# Patient Record
Sex: MALE | Race: WHITE | NOT HISPANIC OR LATINO | Employment: OTHER | URBAN - METROPOLITAN AREA
[De-identification: names, ages, dates, MRNs, and addresses within clinical notes are randomized per-mention and may not be internally consistent; named-entity substitution may affect disease eponyms.]

---

## 2023-10-23 ENCOUNTER — OFFICE VISIT (OUTPATIENT)
Dept: URGENT CARE | Facility: CLINIC | Age: 68
End: 2023-10-23
Payer: MEDICARE

## 2023-10-23 VITALS
RESPIRATION RATE: 20 BRPM | TEMPERATURE: 99 F | WEIGHT: 182.81 LBS | DIASTOLIC BLOOD PRESSURE: 99 MMHG | OXYGEN SATURATION: 98 % | SYSTOLIC BLOOD PRESSURE: 158 MMHG | HEIGHT: 72 IN | HEART RATE: 72 BPM | BODY MASS INDEX: 24.76 KG/M2

## 2023-10-23 DIAGNOSIS — R05.1 ACUTE COUGH: Primary | ICD-10-CM

## 2023-10-23 PROCEDURE — 99204 OFFICE O/P NEW MOD 45 MIN: CPT | Mod: S$GLB,,, | Performed by: NURSE PRACTITIONER

## 2023-10-23 PROCEDURE — 99204 PR OFFICE/OUTPT VISIT, NEW, LEVL IV, 45-59 MIN: ICD-10-PCS | Mod: S$GLB,,, | Performed by: NURSE PRACTITIONER

## 2023-10-23 RX ORDER — BENZONATATE 200 MG/1
200 CAPSULE ORAL 3 TIMES DAILY PRN
Qty: 30 CAPSULE | Refills: 0 | Status: SHIPPED | OUTPATIENT
Start: 2023-10-23 | End: 2023-11-02

## 2023-10-23 NOTE — PATIENT INSTRUCTIONS
Claritin(loratadine), Allegra(fexofenadine), or zyrtec(cetirizine) for post nasal drip triggering cough. Take daily for 2 weeks.

## 2023-10-23 NOTE — PROGRESS NOTES
"Subjective:      Patient ID: Rizwan Burgos is a 67 y.o. male.    Vitals:  height is 5' 11.5" (1.816 m) and weight is 82.9 kg (182 lb 12.8 oz). His temperature is 98.5 °F (36.9 °C). His blood pressure is 158/99 (abnormal) and his pulse is 72. His respiration is 20 and oxygen saturation is 98%.     Chief Complaint: Cough    New patient to clinic.  Notes a cough with the last 3 weeks.  Denies any shortness a breath or pain.  Notes cough minimally productive.  No chronic medical problems.  No current meds.  No night sweats.  No weight loss.    Reviewed family medical history note extensive cancer.    Cough  The current episode started 1 to 4 weeks ago. The problem has been unchanged. The cough is Productive of sputum. Associated symptoms include nasal congestion and postnasal drip. Pertinent negatives include no chills or fever. He has tried OTC cough suppressant for the symptoms. The treatment provided moderate relief.       Constitution: Negative for chills, sweating, fatigue, fever, unexpected weight change and generalized weakness.   HENT:  Positive for postnasal drip.    Neck: neck negative.   Cardiovascular: Negative.    Eyes: Negative.    Respiratory:  Positive for cough.    Gastrointestinal: Negative.    Hematologic/Lymphatic: Negative.       Objective:     Physical Exam   HENT:   Head: Normocephalic and atraumatic.   Nose: Rhinorrhea present. No congestion.   Mouth/Throat: Mucous membranes are moist. No oropharyngeal exudate or posterior oropharyngeal erythema. Oropharynx is clear.      Comments: PND noted  Eyes: Pupils are equal, round, and reactive to light.   Cardiovascular: Normal rate, regular rhythm, normal heart sounds and normal pulses.   Pulmonary/Chest: Effort normal and breath sounds normal.   Abdominal: Normal appearance.   Neurological: He is alert.   Vitals reviewed.      Assessment:     1. Acute cough        Plan:       Acute cough    Other orders  -     benzonatate (TESSALON) 200 MG capsule; " Take 1 capsule (200 mg total) by mouth 3 (three) times daily as needed for Cough.  Dispense: 30 capsule; Refill: 0                Cough for less than 3 weeks.  Recommended Tessalon as above.  Antihistamine for postnasal drip.  Discussed next steps on follow-up.  For chronic cough consider infectious etiology no symptomatic symptoms.  Also consider GERD.  If no controlled there or systemic symptoms fever chills night sweats or weight loss consider additional workup with labs.    Blood pressure elevated discussed sodium restriction.  New patient: In excessive of 45 minutes total time spent for evaluation and management services. Time included elements of the following: time spent preparing to see patient, obtaining and reviewing separately obtained history, exam, evaluation, counseling and education of patient/family member or care giver, documenting in the HMR, independently interpreting results and communication of results, coordination of care ordering medications, tests, or procedures, referral and communication to other health care professionals.